# Patient Record
Sex: FEMALE | Race: WHITE
[De-identification: names, ages, dates, MRNs, and addresses within clinical notes are randomized per-mention and may not be internally consistent; named-entity substitution may affect disease eponyms.]

---

## 2018-06-14 ENCOUNTER — HOSPITAL ENCOUNTER (OUTPATIENT)
Dept: HOSPITAL 92 - SDC | Age: 68
End: 2018-06-14
Attending: OPHTHALMOLOGY
Payer: MEDICARE

## 2018-06-14 VITALS — BODY MASS INDEX: 21 KG/M2

## 2018-06-14 DIAGNOSIS — Z79.82: ICD-10-CM

## 2018-06-14 DIAGNOSIS — Z79.899: ICD-10-CM

## 2018-06-14 DIAGNOSIS — H33.011: Primary | ICD-10-CM

## 2018-06-14 PROCEDURE — 08QE3ZZ REPAIR RIGHT RETINA, PERCUTANEOUS APPROACH: ICD-10-PCS | Performed by: OPHTHALMOLOGY

## 2018-06-14 PROCEDURE — 67025 REPLACE EYE FLUID: CPT

## 2018-06-14 PROCEDURE — 08T43ZZ RESECTION OF RIGHT VITREOUS, PERCUTANEOUS APPROACH: ICD-10-PCS | Performed by: OPHTHALMOLOGY

## 2018-06-14 NOTE — OP
DATE OF SURGERY:  06/14/2018

 

PREOPERATIVE DIAGNOSIS:  Rhegmatogenous retinal detachment, right eye.

 

POSTOPERATIVE DIAGNOSIS:  Rhegmatogenous retinal detachment, right eye.

 

PROCEDURE:  Pars plana vitrectomy and retinal detachment repair, right eye.

 

SURGEON:  Dwayne Garcia M.D.

 

ANESTHESIA:  Local with monitored anesthesia care.

 

COMPLICATIONS:  None.

 

PROCEDURE IN DETAIL:  The patient was identified in the preoperative holding area.  Appropriate infor
med consent for the planned surgical procedure on the right eye had been obtained.  The patient was t
ransferred to the operative suite where appropriate cardiopulmonary monitoring established.  Local an
esthesia obtained using retrobulbar and modified Van Lint lid block using 50/50 mixture of 4% lidocai
ne and 0.75% bupivacaine.  The patient was prepped and draped in the usual sterile manner for ophthal
imelda surgery on the right eye.  Lid speculum was placed in the right eye.  The 25-gauge trocars were p
laced in conjunctiva.  Slit sclera supratemporally, inferotemporally, and supranasally.  Infusion alirio
e was placed inferotemporally.  Light pipe and vitreous cutter were inserted into the eye.  Core vitr
ectomy was performed.  Attention was turned to the superior tear.  Vitreous traction was removed from
 the tear and it was marked with endocautery.  Posterior drain retinotomy was created.  Complete air 
fluid exchange was performed with 10 minutes being allowed for fluid to drain posteriorly.  A 360 las
er was placed using endolaser delivery device.  A 28% sulfur hexafluoride gas was infused into the ey
e.  Trocars were removed.  Supratemporal supranasal sclerotomies were sutured closed.  Eye was noted 
to retain pressure well.  Retrobulbar Kenalog and subconjunctival Ancef were placed.  Antibiotic oint
ment was placed, and the eye was patched and shielded.  The patient was taken to postop recovery unit
 in good condition having suffered no immediate perioperative complications.  The patient was instruc
vin to keep patch and shield on, position right side down and follow up in the morning with Dr. Garcia.